# Patient Record
Sex: MALE | HISPANIC OR LATINO | URBAN - METROPOLITAN AREA
[De-identification: names, ages, dates, MRNs, and addresses within clinical notes are randomized per-mention and may not be internally consistent; named-entity substitution may affect disease eponyms.]

---

## 2022-08-05 ENCOUNTER — WEB ENCOUNTER (OUTPATIENT)
Dept: URBAN - METROPOLITAN AREA CLINIC 113 | Facility: CLINIC | Age: 22
End: 2022-08-05

## 2022-08-05 ENCOUNTER — DASHBOARD ENCOUNTERS (OUTPATIENT)
Age: 22
End: 2022-08-05

## 2022-08-05 ENCOUNTER — OFFICE VISIT (OUTPATIENT)
Dept: URBAN - METROPOLITAN AREA CLINIC 113 | Facility: CLINIC | Age: 22
End: 2022-08-05
Payer: OTHER GOVERNMENT

## 2022-08-05 VITALS
RESPIRATION RATE: 18 BRPM | BODY MASS INDEX: 28.51 KG/M2 | DIASTOLIC BLOOD PRESSURE: 81 MMHG | HEART RATE: 101 BPM | SYSTOLIC BLOOD PRESSURE: 128 MMHG | WEIGHT: 192.5 LBS | TEMPERATURE: 98.3 F | HEIGHT: 69 IN

## 2022-08-05 DIAGNOSIS — I88.0 MESENTERIC LYMPHADENITIS: ICD-10-CM

## 2022-08-05 DIAGNOSIS — R10.31 RIGHT LOWER QUADRANT ABDOMINAL PAIN: ICD-10-CM

## 2022-08-05 DIAGNOSIS — R10.32 LEFT LOWER QUADRANT ABDOMINAL PAIN: ICD-10-CM

## 2022-08-05 PROBLEM — 301716002: Status: ACTIVE | Noted: 2022-08-05

## 2022-08-05 PROBLEM — 301754002: Status: ACTIVE | Noted: 2022-08-05

## 2022-08-05 PROBLEM — 44897000: Status: ACTIVE | Noted: 2022-08-05

## 2022-08-05 PROCEDURE — 99204 OFFICE O/P NEW MOD 45 MIN: CPT | Performed by: NURSE PRACTITIONER

## 2022-08-05 RX ORDER — SERTRALINE 50 MG/1
1 TABLET TABLET, FILM COATED ORAL ONCE A DAY
Status: ACTIVE | COMMUNITY

## 2022-08-05 RX ORDER — ESZOPICLONE 3 MG/1
1 TABLET IMMEDIATELY BEFORE BEDTIME TABLET, FILM COATED ORAL ONCE A DAY
Status: ACTIVE | COMMUNITY

## 2022-08-05 NOTE — HPI-TODAY'S VISIT:
This is a 21-year-old male with a history of anxiety and a sleep disorder presenting for evaluation of abdominal pain. He reports onset of symptoms 2 months ago.  He is having pain across his lower abdomen.  He reports that he feels like the need to  urinate badly.  It does not improve when he urinates or worsen when he urinates.  He had frequent urination for the first months that he experienced pain.  This has improved.  He also reports pain is exacerbated by lifting and eating spicy food.  He denies diarrhea or constipation.  He denies red blood per rectum, melena, or any other abdominal symptoms.  He has been gaining weight.  He denies a family history of GI cancers or inflammatory bowel disease.  He has a history of a back injury in 2021.  Back pain is exacerbated by prolonged sitting. At onset of pain 2 months ago, he was evaluated at the emergency department at [facility].  He reports labs and a CT scan were performed.  He was given medication.  His symptoms persisted.  He had follow-up with his primary care provider who advised that he come here for further evaluation.  According to limited records, the CT scan demonstrated mesenteric lymphadenitis and he was prescribed naproxen 500 mg twice a day and dicyclomine 20 mg as needed.  He reports there were no changes in his symptoms on these medications.

## 2022-08-05 NOTE — PHYSICAL EXAM GASTROINTESTINAL
Abdomen , soft, nontender, nondistended , no guarding or rigidity , no masses palpable , normal bowel sounds , Liver and Spleen , no hepatosplenomegaly
Strong peripheral pulses

## 2022-10-10 ENCOUNTER — OFFICE VISIT (OUTPATIENT)
Dept: URBAN - METROPOLITAN AREA CLINIC 113 | Facility: CLINIC | Age: 22
End: 2022-10-10

## 2022-10-10 RX ORDER — ESZOPICLONE 3 MG/1
1 TABLET IMMEDIATELY BEFORE BEDTIME TABLET, FILM COATED ORAL ONCE A DAY
Status: ACTIVE | COMMUNITY

## 2022-10-10 RX ORDER — SERTRALINE 50 MG/1
1 TABLET TABLET, FILM COATED ORAL ONCE A DAY
Status: ACTIVE | COMMUNITY

## 2022-10-10 NOTE — HPI-TODAY'S VISIT:
This is a 22-year-old male with a history of anxiety, sleep disorder, and right and left lower quadrant abdominal pain, and mesenteric lymphadenitis presenting for follow-up. He was initially seen 8/5/2022.  He reported onset of symptoms 2 months prior to his visit describing pain in the lower abdomen.  He reported positional exacerbations suggesting a musculoskeletal disorder.  Radicular pain associated with prior back injury was a consideration.  However, he had a recent CT scan without contrast that demonstrated mesenteric lymphadenitis.  He had not responded to dicyclomine or a course of naproxen.  A repeat CT with contrast was recommended.  He was to follow-up with his primary care provider regarding back pain. .  No CT results available.